# Patient Record
Sex: FEMALE
[De-identification: names, ages, dates, MRNs, and addresses within clinical notes are randomized per-mention and may not be internally consistent; named-entity substitution may affect disease eponyms.]

---

## 2020-12-10 ENCOUNTER — TRANSCRIPTION ENCOUNTER (OUTPATIENT)
Age: 35
End: 2020-12-10

## 2021-12-31 ENCOUNTER — TRANSCRIPTION ENCOUNTER (OUTPATIENT)
Age: 36
End: 2021-12-31

## 2022-01-11 ENCOUNTER — APPOINTMENT (OUTPATIENT)
Dept: RADIOLOGY | Facility: CLINIC | Age: 37
End: 2022-01-11
Payer: MEDICAID

## 2022-01-11 ENCOUNTER — OUTPATIENT (OUTPATIENT)
Dept: OUTPATIENT SERVICES | Facility: HOSPITAL | Age: 37
LOS: 1 days | End: 2022-01-11
Payer: MEDICAID

## 2022-01-11 ENCOUNTER — TRANSCRIPTION ENCOUNTER (OUTPATIENT)
Age: 37
End: 2022-01-11

## 2022-01-11 DIAGNOSIS — Z00.8 ENCOUNTER FOR OTHER GENERAL EXAMINATION: ICD-10-CM

## 2022-01-11 PROBLEM — Z00.00 ENCOUNTER FOR PREVENTIVE HEALTH EXAMINATION: Status: ACTIVE | Noted: 2022-01-11

## 2022-01-11 PROCEDURE — 73110 X-RAY EXAM OF WRIST: CPT | Mod: 26,RT

## 2022-01-11 PROCEDURE — 73130 X-RAY EXAM OF HAND: CPT | Mod: 26,RT

## 2022-01-11 PROCEDURE — 73130 X-RAY EXAM OF HAND: CPT

## 2022-01-11 PROCEDURE — 73110 X-RAY EXAM OF WRIST: CPT

## 2022-01-25 ENCOUNTER — APPOINTMENT (OUTPATIENT)
Dept: ORTHOPEDIC SURGERY | Facility: CLINIC | Age: 37
End: 2022-01-25

## 2022-05-03 ENCOUNTER — NON-APPOINTMENT (OUTPATIENT)
Age: 37
End: 2022-05-03

## 2022-05-09 ENCOUNTER — APPOINTMENT (OUTPATIENT)
Dept: PHYSICAL MEDICINE AND REHAB | Facility: CLINIC | Age: 37
End: 2022-05-09

## 2022-06-06 ENCOUNTER — APPOINTMENT (OUTPATIENT)
Dept: PHYSICAL MEDICINE AND REHAB | Facility: CLINIC | Age: 37
End: 2022-06-06
Payer: MEDICAID

## 2022-06-06 VITALS
DIASTOLIC BLOOD PRESSURE: 69 MMHG | TEMPERATURE: 96.8 F | WEIGHT: 156 LBS | BODY MASS INDEX: 29.45 KG/M2 | HEART RATE: 84 BPM | SYSTOLIC BLOOD PRESSURE: 108 MMHG | OXYGEN SATURATION: 97 % | HEIGHT: 61 IN

## 2022-06-06 DIAGNOSIS — N31.9 NEUROMUSCULAR DYSFUNCTION OF BLADDER, UNSPECIFIED: ICD-10-CM

## 2022-06-06 PROCEDURE — 99204 OFFICE O/P NEW MOD 45 MIN: CPT

## 2022-06-06 RX ORDER — NORETHINDRONE ACETATE AND ETHINYL ESTRADIOL 1; 20 MG/1; UG/1
1-20 TABLET ORAL
Qty: 21 | Refills: 0 | Status: ACTIVE | COMMUNITY
Start: 2022-03-14

## 2022-06-06 NOTE — PHYSICAL EXAM
[FreeTextEntry1] : General: Well-developed female in no apparent distress.  Patient is awake, alert, and oriented x3.  Cooperative\par Lungs: Clear to auscultation.\par Cardiac: Regular rate and rhythm.\par Abdomen: Bowel sounds present, nondistended.\par Extremities: Right Charcot foot/ankle.  Skin abrasion with superficial ulcer around the mid tibia area following the border of the [place] brace on the right.\par \par Left BKA: Conical shape.  Hypertrophic skin over the distal patella and patella tendon.  Hyperpigmentation at the distal tibia, no tenderness to palpation.  No erythema or warmth.  No skin breakdown.  No skin adhesions.  Full active range of motion at the knee.\par \par Motor:\par Both upper extremities: Tone normal, active range of motion within functional limits with 5/5 motor power throughout.  Thumb to digit opposition intact bilaterally.\par \par Both lower extremities: Hip flexion 4+ to 5/5 motor power, knee extension 5/5 motor power.  Right ankle dorsiflexion 4-4+/5 motor power, right ankle inversion/eversion/plantarflexion 4+/5 motor power.\par \par Sensory: Absent pinprick and light touch below the knee on the right.\par \par Functional status: Patient ambulated with bilateral compensated gluteus medius gait impairment.  Patient ambulated with a left BKA prosthesis with a silicone locking liner and 8 ply socks.  Padding added to the socket anteriorly next to the tibia.  Some medial thrusting of the left knee noted during ambulation.  Patient is a K3 ambulator.

## 2022-06-06 NOTE — HISTORY OF PRESENT ILLNESS
[FreeTextEntry1] : Patient is a 37-year-old right-hand-dominant female history of sacral spina bifida, neurogenic bladder, history left BKA approximately 20 years ago who presents today for prosthetic evaluation.  The patient's current left BK prosthesis is 4 to 5 years old.  Patient complains that the socket is large causing distal pressure and pain at the residual limb.  Patient is also getting pain at the left knee during ambulation.  Patient is in episodically getting blisters on the residual limb though currently healed.  She does feel there is some instability at the left knee.  Patient is wearing 8 ply socks.  Some padding has been added to the socket.  Patient's reports approximately 10 to 20 pound weight gain over the past 2 years.  Functionally the patient ambulates in the home without an assistive device.  Patient uses a motorized scooter in the community for longer distance ambulation.  Patient has reported 3-4 falls per week typically from loss of balance.  Functionally she is an independent dressing, independent toileting, independent ADLs.  Patient does work as a  3 days a week.\par Patient has a right [place] brace and does report getting skin abrasions along the upper border of the brace.

## 2022-06-06 NOTE — REVIEW OF SYSTEMS
[Patient Intake Form Reviewed] : Patient intake form was reviewed [Recent Change In Weight] : ~T recent weight change [Incontinence] : incontinence [Muscle Weakness] : muscle weakness [Skin Wound] : skin wound [Difficulty Walking] : difficulty walking [Negative] : Gastrointestinal [Fever] : no fever

## 2022-06-06 NOTE — ASSESSMENT
[FreeTextEntry1] : Patient is a 37-year-old female history of sacral spina bifida, neurogenic bladder, hx left BKA (~ 20 years ago) who is current left BK prosthesis is 4 to 5 years old and the prosthetic socket is too large causing instability, pain and episodic blistering.  In addition the patient's silicone liners are stretched and worn and require replacement.  Patient is a K3 ambulator.  Prescription provided for a left custom molded Endo skeletal BK prosthesis with a total contact acrylic socket, flexible inner socket, silicone locking liner with locking mechanism, ultralight alignment components, K3 prosthetic foot, outer protective cover, 6 multiple ply socks, 6 single ply socks.\par The patient's [place] brace on the right is causing skin abrasion due to pistoning.  Recommend patient wear a cotton sock between the brace and her skin.  Discussed the benefits of a custom molded SAFO to better spread the forces and reduce pistoning.  Prescription provided for a right custom molded S AFO with valgus control, instep strap, full footplate, flexible toe, fully lined.  Prescription provided for course of outpatient physical therapy 2-3 times per week, see Rx.\par \par I spent a total of 45 minutes on the date of the encounter evaluating and treating the patient including a discussion of treatment options.

## 2022-07-25 ENCOUNTER — APPOINTMENT (OUTPATIENT)
Dept: PHYSICAL MEDICINE AND REHAB | Facility: CLINIC | Age: 37
End: 2022-07-25

## 2022-07-25 PROCEDURE — 99213 OFFICE O/P EST LOW 20 MIN: CPT

## 2022-07-25 RX ORDER — FLUOXETINE HYDROCHLORIDE 20 MG/1
20 CAPSULE ORAL
Qty: 30 | Refills: 0 | Status: ACTIVE | COMMUNITY
Start: 2021-10-04

## 2022-07-25 RX ORDER — FLUOXETINE HYDROCHLORIDE 10 MG/1
10 CAPSULE ORAL
Qty: 30 | Refills: 0 | Status: ACTIVE | COMMUNITY
Start: 2022-07-21

## 2022-07-25 RX ORDER — FOLIC ACID 1 MG/1
1 TABLET ORAL
Qty: 30 | Refills: 0 | Status: ACTIVE | COMMUNITY
Start: 2022-06-13

## 2022-07-25 RX ORDER — TERCONAZOLE 4 MG/G
0.4 CREAM VAGINAL
Qty: 45 | Refills: 0 | Status: ACTIVE | COMMUNITY
Start: 2022-03-14

## 2022-07-25 RX ORDER — ERGOCALCIFEROL 1.25 MG/1
1.25 MG CAPSULE, LIQUID FILLED ORAL
Qty: 4 | Refills: 0 | Status: ACTIVE | COMMUNITY
Start: 2022-06-13

## 2022-07-25 NOTE — PHYSICAL EXAM
[FreeTextEntry1] : General: Well-developed female in no apparent distress.  Patient is awake, alert, and oriented x3.  Cooperative\par Extremities: Right Charcot foot/ankle.  \par \par \par Motor:\par Both upper extremities: Tone normal, active range of motion within functional limits with 5/5 motor power throughout.  Thumb to digit opposition intact bilaterally.\par \par Both lower extremities: Hip flexion 4+ to 5/5 motor power, knee extension 5/5 motor power.  Right ankle dorsiflexion 4-4+/5 motor power, right ankle inversion/eversion/plantarflexion 4+/5 motor power.\par \par Sensory: Absent pinprick and light touch below the knee on the right.\par \par Functional status: Patient ambulated with bilateral compensated gluteus medius gait impairment (Lt>Rt).  ambulator.

## 2022-07-25 NOTE — HISTORY OF PRESENT ILLNESS
[FreeTextEntry1] : Patient is a 37-year-old female history as sacral spina bifida, neurogenic bladder, left BKA (20 years ago) who was last seen June 6, 2022.  Patient reports that her new left BK prosthesis and right S AFO are being fabricated and should be delivered in the next 2 weeks.  Patient presents today for improvement of balance.  Patient states that she is now interested in getting a rolling walker and starting physical therapy.  Patient reports 3-4 falls per week typically from losing her balance.  Functionally she is an independent ambulator without an assistive device, independent toileting, independent ADLs.

## 2022-07-25 NOTE — ASSESSMENT
[FreeTextEntry1] : Patient is a 37-year-old female PMHx sacral spina bifida, neurogenic bladder, left BKA (20 years ago) with gait impairments noted above.  Patient with significant bilateral compensated gluteus medius gait secondary to hip abduction weakness.  Prescription provided for a Rollator walker to improve trunk stability and balance.  Prescription provided for patient to start outpatient physical therapy 2-3 times per week, see Rx.  Patient's new left BK prosthesis and right SAFO are being fabricated and will be assessed next visit.\par \par I spent a total of 20 minutes on the date of the encounter evaluating and treating the patient including a discussion of treatment options.

## 2022-08-31 ENCOUNTER — APPOINTMENT (OUTPATIENT)
Dept: PHYSICAL MEDICINE AND REHAB | Facility: CLINIC | Age: 37
End: 2022-08-31

## 2022-09-12 ENCOUNTER — APPOINTMENT (OUTPATIENT)
Dept: PHYSICAL MEDICINE AND REHAB | Facility: CLINIC | Age: 37
End: 2022-09-12

## 2022-09-12 VITALS
SYSTOLIC BLOOD PRESSURE: 120 MMHG | HEART RATE: 97 BPM | RESPIRATION RATE: 19 BRPM | TEMPERATURE: 97.52 F | DIASTOLIC BLOOD PRESSURE: 71 MMHG | OXYGEN SATURATION: 99 %

## 2022-09-12 DIAGNOSIS — Q05.8: ICD-10-CM

## 2022-09-12 DIAGNOSIS — R26.9 UNSPECIFIED ABNORMALITIES OF GAIT AND MOBILITY: ICD-10-CM

## 2022-09-12 DIAGNOSIS — M14.671 CHARCOT'S JOINT, RIGHT ANKLE AND FOOT: ICD-10-CM

## 2022-09-12 DIAGNOSIS — S88.112D COMPLETE TRAUMATIC AMPUTATION AT LVL BETWEEN KNEE AND ANKLE, LEFT LOWER LEG, SUBSEQUENT ENCOUNTER: ICD-10-CM

## 2022-09-12 DIAGNOSIS — B36.9 SUPERFICIAL MYCOSIS, UNSPECIFIED: ICD-10-CM

## 2022-09-12 PROCEDURE — 99213 OFFICE O/P EST LOW 20 MIN: CPT

## 2022-09-12 RX ORDER — CLOTRIMAZOLE AND BETAMETHASONE DIPROPIONATE 10; .5 MG/G; MG/G
1-0.05 CREAM TOPICAL TWICE DAILY
Qty: 1 | Refills: 0 | Status: ACTIVE | COMMUNITY
Start: 2022-09-12 | End: 1900-01-01

## 2022-09-12 NOTE — PHYSICAL EXAM
[FreeTextEntry1] : General: Well-developed female in no apparent distress.  Patient is awake, alert, and oriented x3.  Cooperative\par Extremities: Right Charcot foot/ankle.  Patient with fungal dermatitis around the mid tibia area.  No ulceration or discharge\par \par \par Motor:\par Both upper extremities: Tone normal, active range of motion within functional limits with 5/5 motor power throughout.  Thumb to digit opposition intact bilaterally.\par \par Both lower extremities: Hip flexion 4+ to 5/5 motor power, knee extension 5/5 motor power.  Right ankle dorsiflexion 4-4+/5 motor power, right ankle inversion/eversion/plantarflexion 4+/5 motor power.\par \par Sensory: Absent pinprick and light touch below the knee on the right.\par \par Functional status: Patient ambulated with bilateral compensated gluteus medius gait impairment (Lt>Rt) without an assistive device.  Patient wearing a left BK prosthesis with silicone locking liner and 3 ply socks.  Patient's right [place] brace fits well.  Patient is a K3 ambulator.

## 2022-09-12 NOTE — ASSESSMENT
[FreeTextEntry1] : Patient is a 37-year-old female PMHx sacral spina bifida, neurogenic bladder, left BKA (20 years ago) with gait impairments noted above.  Patient with significant bilateral compensated gluteus medius gait secondary to hip abduction weakness.  Prescription again provided for a Rollator walker to improve trunk stability and balance.  Encourage patient to obtain her Rollator walker.  Patient to start outpatient physical therapy this week.  Patient's right [place] brace and left BK prosthesis fit well.  Patient with a fungal dermatitis at the right mid tibial area. Patient to discontinue her antibiotic cream and prescription provided for clotrimazole-betamethasone 1-0.5% external cream to affected area BID.\par \par I spent a total of 20 minutes on the date of the encounter evaluating and treating the patient including a discussion of treatment options.

## 2022-09-12 NOTE — REVIEW OF SYSTEMS
[Muscle Weakness] : muscle weakness [Difficulty Walking] : difficulty walking [Negative] : Gastrointestinal [Skin Rash] : skin rash [Fever] : no fever

## 2022-10-06 ENCOUNTER — APPOINTMENT (OUTPATIENT)
Dept: PHYSICAL MEDICINE AND REHAB | Facility: CLINIC | Age: 37
End: 2022-10-06

## 2022-10-24 ENCOUNTER — RX RENEWAL (OUTPATIENT)
Age: 37
End: 2022-10-24

## 2022-11-07 ENCOUNTER — APPOINTMENT (OUTPATIENT)
Dept: PHYSICAL MEDICINE AND REHAB | Facility: CLINIC | Age: 37
End: 2022-11-07

## 2025-04-28 ENCOUNTER — EMERGENCY (EMERGENCY)
Facility: HOSPITAL | Age: 40
LOS: 0 days | Discharge: HOME | End: 2025-04-28
Attending: EMERGENCY MEDICINE
Payer: MEDICAID

## 2025-04-28 VITALS
RESPIRATION RATE: 18 BRPM | HEART RATE: 70 BPM | TEMPERATURE: 98 F | WEIGHT: 171.52 LBS | DIASTOLIC BLOOD PRESSURE: 74 MMHG | OXYGEN SATURATION: 100 % | SYSTOLIC BLOOD PRESSURE: 115 MMHG

## 2025-04-28 DIAGNOSIS — Q05.9 SPINA BIFIDA, UNSPECIFIED: ICD-10-CM
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
## 2025-04-28 DIAGNOSIS — S20.212A CONTUSION OF LEFT FRONT WALL OF THORAX, INITIAL ENCOUNTER: ICD-10-CM

## 2025-04-28 DIAGNOSIS — R51.9 HEADACHE, UNSPECIFIED: ICD-10-CM
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
## 2025-04-28 DIAGNOSIS — R07.81 PLEURODYNIA: ICD-10-CM

## 2025-04-28 DIAGNOSIS — N30.90 CYSTITIS, UNSPECIFIED WITHOUT HEMATURIA: ICD-10-CM

## 2025-04-28 DIAGNOSIS — R07.89 OTHER CHEST PAIN: ICD-10-CM

## 2025-04-28 LAB
ABO RH CONFIRMATION: SIGNIFICANT CHANGE UP
ALBUMIN SERPL ELPH-MCNC: 3.7 G/DL — SIGNIFICANT CHANGE UP (ref 3.3–5)
ALLERGY+IMMUNOLOGY DIAG STUDY NOTE: SIGNIFICANT CHANGE UP
ALP SERPL-CCNC: 111 U/L — SIGNIFICANT CHANGE UP (ref 40–120)
ALT FLD-CCNC: 28 U/L — SIGNIFICANT CHANGE UP (ref 12–78)
ANION GAP SERPL CALC-SCNC: 3 MMOL/L — LOW (ref 5–17)
APPEARANCE UR: ABNORMAL
APTT BLD: 29.7 SEC — SIGNIFICANT CHANGE UP (ref 26.1–36.8)
AST SERPL-CCNC: 10 U/L — LOW (ref 15–37)
BACTERIA # UR AUTO: ABNORMAL /HPF
BASOPHILS # BLD AUTO: 0.03 K/UL — SIGNIFICANT CHANGE UP (ref 0–0.2)
BASOPHILS NFR BLD AUTO: 0.3 % — SIGNIFICANT CHANGE UP (ref 0–2)
BILIRUB SERPL-MCNC: 0.2 MG/DL — SIGNIFICANT CHANGE UP (ref 0.2–1.2)
BILIRUB UR-MCNC: NEGATIVE — SIGNIFICANT CHANGE UP
BLD GP AB SCN SERPL QL: SIGNIFICANT CHANGE UP
BUN SERPL-MCNC: 12 MG/DL — SIGNIFICANT CHANGE UP (ref 7–23)
CALCIUM SERPL-MCNC: 9.2 MG/DL — SIGNIFICANT CHANGE UP (ref 8.5–10.1)
CAST: 0 /LPF — SIGNIFICANT CHANGE UP (ref 0–4)
CHLORIDE SERPL-SCNC: 106 MMOL/L — SIGNIFICANT CHANGE UP (ref 96–108)
CO2 SERPL-SCNC: 27 MMOL/L — SIGNIFICANT CHANGE UP (ref 22–31)
COLOR SPEC: YELLOW — SIGNIFICANT CHANGE UP
CREAT SERPL-MCNC: 0.54 MG/DL — SIGNIFICANT CHANGE UP (ref 0.5–1.3)
DIFF PNL FLD: NEGATIVE — SIGNIFICANT CHANGE UP
DIR ANTIGLOB POLYSPECIFIC INTERPRETATION: SIGNIFICANT CHANGE UP
EGFR: 120 ML/MIN/1.73M2 — SIGNIFICANT CHANGE UP
EGFR: 120 ML/MIN/1.73M2 — SIGNIFICANT CHANGE UP
EOSINOPHIL # BLD AUTO: 0.09 K/UL — SIGNIFICANT CHANGE UP (ref 0–0.5)
EOSINOPHIL NFR BLD AUTO: 1 % — SIGNIFICANT CHANGE UP (ref 0–6)
GLUCOSE SERPL-MCNC: 104 MG/DL — HIGH (ref 70–99)
GLUCOSE UR QL: NEGATIVE MG/DL — SIGNIFICANT CHANGE UP
HCT VFR BLD CALC: 39.2 % — SIGNIFICANT CHANGE UP (ref 34.5–45)
HGB BLD-MCNC: 12.9 G/DL — SIGNIFICANT CHANGE UP (ref 11.5–15.5)
IMM GRANULOCYTES # BLD AUTO: 0.01 K/UL — SIGNIFICANT CHANGE UP (ref 0–0.07)
IMM GRANULOCYTES NFR BLD AUTO: 0.1 % — SIGNIFICANT CHANGE UP (ref 0–0.9)
INR BLD: 0.94 RATIO — SIGNIFICANT CHANGE UP (ref 0.85–1.16)
KETONES UR-MCNC: NEGATIVE MG/DL — SIGNIFICANT CHANGE UP
LEUKOCYTE ESTERASE UR-ACNC: NEGATIVE — SIGNIFICANT CHANGE UP
LYMPHOCYTES # BLD AUTO: 2.59 K/UL — SIGNIFICANT CHANGE UP (ref 1–3.3)
LYMPHOCYTES NFR BLD AUTO: 29.9 % — SIGNIFICANT CHANGE UP (ref 13–44)
MCHC RBC-ENTMCNC: 29.7 PG — SIGNIFICANT CHANGE UP (ref 27–34)
MCHC RBC-ENTMCNC: 32.9 G/DL — SIGNIFICANT CHANGE UP (ref 32–36)
MCV RBC AUTO: 90.3 FL — SIGNIFICANT CHANGE UP (ref 80–100)
MONOCYTES # BLD AUTO: 0.44 K/UL — SIGNIFICANT CHANGE UP (ref 0–0.9)
MONOCYTES NFR BLD AUTO: 5.1 % — SIGNIFICANT CHANGE UP (ref 2–14)
NEUTROPHILS # BLD AUTO: 5.5 K/UL — SIGNIFICANT CHANGE UP (ref 1.8–7.4)
NEUTROPHILS NFR BLD AUTO: 63.6 % — SIGNIFICANT CHANGE UP (ref 43–77)
NITRITE UR-MCNC: POSITIVE
NRBC # BLD AUTO: 0 K/UL — SIGNIFICANT CHANGE UP (ref 0–0)
NRBC # FLD: 0 K/UL — SIGNIFICANT CHANGE UP (ref 0–0)
NRBC BLD AUTO-RTO: 0 /100 WBCS — SIGNIFICANT CHANGE UP (ref 0–0)
PH UR: 7 — SIGNIFICANT CHANGE UP (ref 5–8)
PLATELET # BLD AUTO: 341 K/UL — SIGNIFICANT CHANGE UP (ref 150–400)
PMV BLD: 9.4 FL — SIGNIFICANT CHANGE UP (ref 7–13)
POCT URINE PREGNANCY TEST: NEGATIVE — SIGNIFICANT CHANGE UP
POTASSIUM SERPL-MCNC: 4 MMOL/L — SIGNIFICANT CHANGE UP (ref 3.5–5.3)
POTASSIUM SERPL-SCNC: 4 MMOL/L — SIGNIFICANT CHANGE UP (ref 3.5–5.3)
PROT SERPL-MCNC: 7.4 GM/DL — SIGNIFICANT CHANGE UP (ref 6–8.3)
PROT UR-MCNC: NEGATIVE MG/DL — SIGNIFICANT CHANGE UP
PROTHROM AB SERPL-ACNC: 10.8 SEC — SIGNIFICANT CHANGE UP (ref 9.9–13.4)
RBC # BLD: 4.34 M/UL — SIGNIFICANT CHANGE UP (ref 3.8–5.2)
RBC # FLD: 12.1 % — SIGNIFICANT CHANGE UP (ref 10.3–14.5)
RBC CASTS # UR COMP ASSIST: 3 /HPF — SIGNIFICANT CHANGE UP (ref 0–4)
SODIUM SERPL-SCNC: 136 MMOL/L — SIGNIFICANT CHANGE UP (ref 135–145)
SP GR SPEC: 1.02 — SIGNIFICANT CHANGE UP (ref 1–1.03)
SQUAMOUS # UR AUTO: 6 /HPF — HIGH (ref 0–5)
UROBILINOGEN FLD QL: 1 MG/DL — SIGNIFICANT CHANGE UP (ref 0.2–1)
WBC # BLD: 8.66 K/UL — SIGNIFICANT CHANGE UP (ref 3.8–10.5)
WBC # FLD AUTO: 8.66 K/UL — SIGNIFICANT CHANGE UP (ref 3.8–10.5)
WBC UR QL: 3 /HPF — SIGNIFICANT CHANGE UP (ref 0–5)

## 2025-04-28 PROCEDURE — 99285 EMERGENCY DEPT VISIT HI MDM: CPT

## 2025-04-28 PROCEDURE — 81001 URINALYSIS AUTO W/SCOPE: CPT

## 2025-04-28 PROCEDURE — 70450 CT HEAD/BRAIN W/O DYE: CPT | Mod: MC

## 2025-04-28 PROCEDURE — 86901 BLOOD TYPING SEROLOGIC RH(D): CPT

## 2025-04-28 PROCEDURE — 86905 BLOOD TYPING RBC ANTIGENS: CPT

## 2025-04-28 PROCEDURE — 86077 PHYS BLOOD BANK SERV XMATCH: CPT

## 2025-04-28 PROCEDURE — 86900 BLOOD TYPING SEROLOGIC ABO: CPT

## 2025-04-28 PROCEDURE — 99284 EMERGENCY DEPT VISIT MOD MDM: CPT | Mod: 25

## 2025-04-28 PROCEDURE — 80053 COMPREHEN METABOLIC PANEL: CPT

## 2025-04-28 PROCEDURE — 85025 COMPLETE CBC W/AUTO DIFF WBC: CPT

## 2025-04-28 PROCEDURE — 71260 CT THORAX DX C+: CPT | Mod: 26

## 2025-04-28 PROCEDURE — 74177 CT ABD & PELVIS W/CONTRAST: CPT | Mod: MC

## 2025-04-28 PROCEDURE — 96374 THER/PROPH/DIAG INJ IV PUSH: CPT | Mod: XU

## 2025-04-28 PROCEDURE — 70450 CT HEAD/BRAIN W/O DYE: CPT | Mod: 26

## 2025-04-28 PROCEDURE — 86880 COOMBS TEST DIRECT: CPT

## 2025-04-28 PROCEDURE — 70486 CT MAXILLOFACIAL W/O DYE: CPT | Mod: MC

## 2025-04-28 PROCEDURE — 72125 CT NECK SPINE W/O DYE: CPT | Mod: MC

## 2025-04-28 PROCEDURE — 36415 COLL VENOUS BLD VENIPUNCTURE: CPT

## 2025-04-28 PROCEDURE — 70486 CT MAXILLOFACIAL W/O DYE: CPT | Mod: 26

## 2025-04-28 PROCEDURE — 86870 RBC ANTIBODY IDENTIFICATION: CPT

## 2025-04-28 PROCEDURE — 72125 CT NECK SPINE W/O DYE: CPT | Mod: 26

## 2025-04-28 PROCEDURE — 76376 3D RENDER W/INTRP POSTPROCES: CPT | Mod: 26

## 2025-04-28 PROCEDURE — 71260 CT THORAX DX C+: CPT | Mod: MC

## 2025-04-28 PROCEDURE — 76376 3D RENDER W/INTRP POSTPROCES: CPT

## 2025-04-28 PROCEDURE — 86850 RBC ANTIBODY SCREEN: CPT

## 2025-04-28 PROCEDURE — 74177 CT ABD & PELVIS W/CONTRAST: CPT | Mod: 26

## 2025-04-28 PROCEDURE — 81025 URINE PREGNANCY TEST: CPT

## 2025-04-28 PROCEDURE — 85610 PROTHROMBIN TIME: CPT

## 2025-04-28 PROCEDURE — 85730 THROMBOPLASTIN TIME PARTIAL: CPT

## 2025-04-28 RX ORDER — ACETAMINOPHEN 500 MG/5ML
1000 LIQUID (ML) ORAL ONCE
Refills: 0 | Status: COMPLETED | OUTPATIENT
Start: 2025-04-28 | End: 2025-04-28

## 2025-04-28 RX ORDER — CEPHALEXIN 250 MG/1
1 CAPSULE ORAL
Qty: 28 | Refills: 0
Start: 2025-04-28 | End: 2025-05-04

## 2025-04-28 RX ADMIN — Medication 400 MILLIGRAM(S): at 18:40

## 2025-04-28 NOTE — ED STATDOCS - CLINICAL SUMMARY MEDICAL DECISION MAKING FREE TEXT BOX
Labs unremarkable.  Scans demonstrate no signs of acute traumatic injury.  Does show incidental finding of mild bladder wall thickening.  UA positive for nitrites, too numerous to count bacteria.  Will treat with trial of oral antibiotics.  Patient additionally received IV Tylenol in department.  Discharged home in good condition with close outpatient follow-up.  Strict return precautions given for any worsening.  Patient verbalized understanding and agree with plan.

## 2025-04-28 NOTE — ED ADULT TRIAGE NOTE - CHIEF COMPLAINT QUOTE
pt bib wheelchair s/p fall into wall at 1230 today. +head strike, unknown loc pt c/o pain to L side of head, L shoulder, and L flank. -allergies. -blood thinner. pmh , spina bifida, -allergies

## 2025-04-28 NOTE — ED STATDOCS - PROGRESS NOTE DETAILS
Felicia: cts wo any acute trauma. ct showing cystitis and ua + but with epithelial cells. will treat ua and ot can fu with her pcp. pt does have hx of tbi and old fxs which is consistent with ct findings. can take otc meds. Discussed with patient need to return to ED if symptoms don't continue to improve or recur or develops any new or worsening symptoms that are of concern.

## 2025-04-28 NOTE — ED STATDOCS - NSFOLLOWUPINSTRUCTIONS_ED_ALL_ED_FT
Please use 650mg tylenol (or acetaminophen) every 6 hours and 600mg motrin (or advil or ibuprofen) every 6 hours as needed for pain/discomfort/swelling.  Make sure not to use more than 3500mg in any 24 hour period.       Infección del tracto urinario Cesar infección del tracto urinario (ITU) es cesar infección de cualquier parte del tracto urinario, que incluye los riñones, los uréteres, la vejiga y la uretra. Los factores de riesgo incluyen ignorar la necesidad de orinar, limpiarse de atrás hacia adelante si es manjeet, ser un hombre no circuncidado y tener diabetes o un sistema inmunológico débil. Los síntomas incluyen micción frecuente, dolor o ardor al orinar, orina maloliente, orina turbia, dolor en la parte inferior del abdomen, keke en la orina y fiebre. Si le recetaron un antibiótico, tómelo según las indicaciones de jones proveedor de atención médica. No deje de jorge el antibiótico aunque empiece a sentirse mejor. BUSQUE ATENCIÓN MÉDICA INMEDIATA SI TIENE ALGUNO DE LOS SIGUIENTES SÍNTOMAS: dolor abdominal o de espalda intenso, fiebre, incapacidad para retener líquidos o medicamentos, mareos/aturdimiento o un cambio en el estado mental.      Contusion    A contusion is a deep bruise. Contusions are the result of a blunt injury to tissues and muscle fibers under the skin. The skin overlying the contusion may turn blue, purple, or yellow. Symptoms also include pain and swelling in the injured area.    SEEK IMMEDIATE MEDICAL CARE IF YOU HAVE ANY OF THE FOLLOWING SYMPTOMS: severe pain, numbness, tingling, pain, weakness, or skin color/temperature change in any part of your body distal to the injury.     Urinary Tract Infection    A urinary tract infection (UTI) is an infection of any part of the urinary tract, which includes the kidneys, ureters, bladder, and urethra. Risk factors include ignoring your need to urinate, wiping back to front if female, being an uncircumcised male, and having diabetes or a weak immune system. Symptoms include frequent urination, pain or burning with urination, foul smelling urine, cloudy urine, pain in the lower abdomen, blood in the urine, and fever. If you were prescribed an antibiotic medicine, take it as told by your health care provider. Do not stop taking the antibiotic even if you start to feel better.    SEEK IMMEDIATE MEDICAL CARE IF YOU HAVE ANY OF THE FOLLOWING SYMPTOMS: severe back or abdominal pain, fever, inability to keep fluids or medicine down, dizziness/lightheadedness, or a change in mental status.

## 2025-04-28 NOTE — ED STATDOCS - PATIENT PORTAL LINK FT
You can access the FollowMyHealth Patient Portal offered by [facility] by registering at the following website: [email or web address]. By joining [employer]’s FollowMyHealth portal, you will also be able to view your health information using other applications (apps) compatible with our system.

## 2025-04-28 NOTE — ED STATDOCS - CARDIAC, MLM
normal rate, regular rhythm, and no murmur. tenderness to sternum, L anterior chest wall, L lateral and posterior chest wall

## 2025-04-28 NOTE — ED STATDOCS - OBJECTIVE STATEMENT
41 y/o F with PMHx of spina bifida presents to the ED c/o L sided head and facial pain, L sided lateral rib pain, L anterior chest wall pain, and L posterior chest wall pain s/p fall. States she was entering her sister's house and tripped and fell into a wall, hitting her head and her L lateral chest. No LOC.